# Patient Record
Sex: FEMALE | Race: WHITE | URBAN - METROPOLITAN AREA
[De-identification: names, ages, dates, MRNs, and addresses within clinical notes are randomized per-mention and may not be internally consistent; named-entity substitution may affect disease eponyms.]

---

## 2022-02-15 PROBLEM — H33.333 RETINAL TEAR: Status: WELL-CONTROLLED | Noted: 2022-02-15

## 2022-02-15 PROBLEM — H43.813 POSTERIOR VITREOUS DETACHMENT: Status: STABILIZING | Noted: 2022-02-15

## 2022-02-15 PROBLEM — H43.813 POSTERIOR VITREOUS DETACHMENT: Noted: 2022-02-15

## 2022-02-15 PROBLEM — H26.8 ACQUIRED CATARACT: Noted: 2022-02-15

## 2022-02-15 PROBLEM — H26.8 ACQUIRED CATARACT: Status: STABILIZING | Noted: 2022-02-15

## 2022-06-17 ASSESSMENT — VISUAL ACUITY
OS_CC: 20/20-1
OD_CC: 20/20-1

## 2022-06-21 ENCOUNTER — PROBLEM (OUTPATIENT)
Dept: URBAN - METROPOLITAN AREA CLINIC 66 | Facility: CLINIC | Age: 72
End: 2022-06-21

## 2022-06-21 DIAGNOSIS — H43.813: ICD-10-CM

## 2022-06-21 DIAGNOSIS — H33.333: ICD-10-CM

## 2022-06-21 PROCEDURE — 92014 COMPRE OPH EXAM EST PT 1/>: CPT

## 2022-06-21 PROCEDURE — 92201 OPSCPY EXTND RTA DRAW UNI/BI: CPT

## 2022-06-21 PROCEDURE — 92134 CPTRZ OPH DX IMG PST SGM RTA: CPT

## 2022-06-21 ASSESSMENT — TONOMETRY
OD_IOP_MMHG: 15
OS_IOP_MMHG: 12

## 2022-09-20 ENCOUNTER — FOLLOW UP (OUTPATIENT)
Dept: URBAN - METROPOLITAN AREA CLINIC 66 | Facility: CLINIC | Age: 72
End: 2022-09-20

## 2022-09-20 DIAGNOSIS — H33.333: ICD-10-CM

## 2022-09-20 DIAGNOSIS — H43.813: ICD-10-CM

## 2022-09-20 PROCEDURE — 92134 CPTRZ OPH DX IMG PST SGM RTA: CPT

## 2022-09-20 PROCEDURE — 92201 OPSCPY EXTND RTA DRAW UNI/BI: CPT

## 2022-09-20 PROCEDURE — 92014 COMPRE OPH EXAM EST PT 1/>: CPT

## 2022-09-20 ASSESSMENT — TONOMETRY
OD_IOP_MMHG: 20
OS_IOP_MMHG: 17

## 2022-09-20 ASSESSMENT — VISUAL ACUITY
OD_CC: 20/20-2
OD_PH: 20/20
OS_CC: 20/30+1
OS_PH: 20/20-2

## 2023-03-31 ENCOUNTER — FOLLOW UP (OUTPATIENT)
Dept: URBAN - METROPOLITAN AREA CLINIC 66 | Facility: CLINIC | Age: 73
End: 2023-03-31

## 2023-03-31 DIAGNOSIS — H33.333: ICD-10-CM

## 2023-03-31 DIAGNOSIS — H43.813: ICD-10-CM

## 2023-03-31 DIAGNOSIS — H35.61: ICD-10-CM

## 2023-03-31 PROCEDURE — 67145 PROPH RTA DTCHMNT PC: CPT

## 2023-03-31 PROCEDURE — 92014 COMPRE OPH EXAM EST PT 1/>: CPT | Mod: 25

## 2023-03-31 PROCEDURE — 92202 OPSCPY EXTND ON/MAC DRAW: CPT

## 2023-03-31 ASSESSMENT — VISUAL ACUITY
OD_CC: 20/20
OS_CC: 20/20-2

## 2023-03-31 ASSESSMENT — TONOMETRY
OS_IOP_MMHG: 15
OD_IOP_MMHG: 15

## 2023-05-19 ENCOUNTER — FOLLOW UP (OUTPATIENT)
Dept: URBAN - METROPOLITAN AREA CLINIC 66 | Facility: CLINIC | Age: 73
End: 2023-05-19

## 2023-05-19 DIAGNOSIS — H43.813: ICD-10-CM

## 2023-05-19 DIAGNOSIS — H33.333: ICD-10-CM

## 2023-05-19 PROCEDURE — 92014 COMPRE OPH EXAM EST PT 1/>: CPT

## 2023-05-19 PROCEDURE — 92201 OPSCPY EXTND RTA DRAW UNI/BI: CPT

## 2023-05-19 PROCEDURE — 92134 CPTRZ OPH DX IMG PST SGM RTA: CPT

## 2023-05-19 ASSESSMENT — VISUAL ACUITY
OS_CC: 20/25-2
OD_CC: 20/25-1

## 2023-05-19 ASSESSMENT — TONOMETRY
OD_IOP_MMHG: 17
OS_IOP_MMHG: 18

## 2023-05-25 ENCOUNTER — APPOINTMENT (OUTPATIENT)
Dept: URBAN - METROPOLITAN AREA CLINIC 278 | Age: 73
Setting detail: DERMATOLOGY
End: 2023-05-25

## 2023-05-25 DIAGNOSIS — Z80.8 FAMILY HISTORY OF MALIGNANT NEOPLASM OF OTHER ORGANS OR SYSTEMS: ICD-10-CM

## 2023-05-25 DIAGNOSIS — D18.0 HEMANGIOMA: ICD-10-CM

## 2023-05-25 DIAGNOSIS — L72.8 OTHER FOLLICULAR CYSTS OF THE SKIN AND SUBCUTANEOUS TISSUE: ICD-10-CM

## 2023-05-25 DIAGNOSIS — L82.1 OTHER SEBORRHEIC KERATOSIS: ICD-10-CM

## 2023-05-25 DIAGNOSIS — Z71.89 OTHER SPECIFIED COUNSELING: ICD-10-CM

## 2023-05-25 DIAGNOSIS — B35.1 TINEA UNGUIUM: ICD-10-CM

## 2023-05-25 DIAGNOSIS — L72.11 PILAR CYST: ICD-10-CM

## 2023-05-25 DIAGNOSIS — D22 MELANOCYTIC NEVI: ICD-10-CM

## 2023-05-25 DIAGNOSIS — L81.4 OTHER MELANIN HYPERPIGMENTATION: ICD-10-CM

## 2023-05-25 PROBLEM — D22.5 MELANOCYTIC NEVI OF TRUNK: Status: ACTIVE | Noted: 2023-05-25

## 2023-05-25 PROBLEM — D18.01 HEMANGIOMA OF SKIN AND SUBCUTANEOUS TISSUE: Status: ACTIVE | Noted: 2023-05-25

## 2023-05-25 PROCEDURE — OTHER SUNSCREEN RECOMMENDATIONS: OTHER

## 2023-05-25 PROCEDURE — OTHER DEFER: OTHER

## 2023-05-25 PROCEDURE — OTHER OBSERVATION: OTHER

## 2023-05-25 PROCEDURE — OTHER REASSURANCE: OTHER

## 2023-05-25 PROCEDURE — OTHER MIPS QUALITY: OTHER

## 2023-05-25 PROCEDURE — OTHER COUNSELING: OTHER

## 2023-05-25 PROCEDURE — OTHER PRESCRIPTION: OTHER

## 2023-05-25 PROCEDURE — OTHER TREATMENT REGIMEN: OTHER

## 2023-05-25 PROCEDURE — 99204 OFFICE O/P NEW MOD 45 MIN: CPT

## 2023-05-25 RX ORDER — CICLOPIROX 80 MG/ML
SOLUTION TOPICAL
Qty: 6.6 | Refills: 4 | Status: ERX | COMMUNITY
Start: 2023-05-25

## 2023-05-25 ASSESSMENT — LOCATION SIMPLE DESCRIPTION DERM
LOCATION SIMPLE: LEFT OCCIPITAL SCALP
LOCATION SIMPLE: RIGHT GREAT TOE
LOCATION SIMPLE: LEFT UPPER BACK
LOCATION SIMPLE: RIGHT UPPER BACK
LOCATION SIMPLE: CHEST
LOCATION SIMPLE: RIGHT GREAT TOE
LOCATION SIMPLE: LEFT UPPER BACK
LOCATION SIMPLE: RIGHT UPPER BACK
LOCATION SIMPLE: UPPER BACK
LOCATION SIMPLE: CHEST
LOCATION SIMPLE: LEFT OCCIPITAL SCALP

## 2023-05-25 ASSESSMENT — LOCATION DETAILED DESCRIPTION DERM
LOCATION DETAILED: LEFT SUPERIOR OCCIPITAL SCALP
LOCATION DETAILED: LOWER STERNUM
LOCATION DETAILED: RIGHT MEDIAL UPPER BACK
LOCATION DETAILED: LEFT INFERIOR MEDIAL UPPER BACK
LOCATION DETAILED: RIGHT GREAT TOENAIL
LOCATION DETAILED: LEFT INFERIOR MEDIAL UPPER BACK
LOCATION DETAILED: LEFT SUPERIOR OCCIPITAL SCALP
LOCATION DETAILED: LEFT MID-UPPER BACK
LOCATION DETAILED: RIGHT GREAT TOENAIL
LOCATION DETAILED: RIGHT MEDIAL UPPER BACK
LOCATION DETAILED: LEFT MID-UPPER BACK
LOCATION DETAILED: SUPERIOR THORACIC SPINE
LOCATION DETAILED: RIGHT MID-UPPER BACK
LOCATION DETAILED: RIGHT INFERIOR MEDIAL UPPER BACK
LOCATION DETAILED: LOWER STERNUM

## 2023-05-25 ASSESSMENT — LOCATION ZONE DERM
LOCATION ZONE: SCALP
LOCATION ZONE: TRUNK
LOCATION ZONE: TOENAIL
LOCATION ZONE: TRUNK
LOCATION ZONE: TOENAIL
LOCATION ZONE: SCALP

## 2023-05-25 NOTE — PROCEDURE: TREATMENT REGIMEN
Initiate Treatment: START topical ciclopirox 8% solution to affected nail once daily
Detail Level: Zone

## 2023-06-14 ENCOUNTER — PROBLEM (OUTPATIENT)
Dept: URBAN - METROPOLITAN AREA CLINIC 58 | Facility: CLINIC | Age: 73
End: 2023-06-14

## 2023-06-14 DIAGNOSIS — H26.8: ICD-10-CM

## 2023-06-14 DIAGNOSIS — H40.013: ICD-10-CM

## 2023-06-14 DIAGNOSIS — H11.31: ICD-10-CM

## 2023-06-14 DIAGNOSIS — H43.813: ICD-10-CM

## 2023-06-14 DIAGNOSIS — H33.333: ICD-10-CM

## 2023-06-14 PROCEDURE — 92134 CPTRZ OPH DX IMG PST SGM RTA: CPT

## 2023-06-14 PROCEDURE — 92014 COMPRE OPH EXAM EST PT 1/>: CPT

## 2023-06-14 PROCEDURE — 92201 OPSCPY EXTND RTA DRAW UNI/BI: CPT

## 2023-06-14 ASSESSMENT — VISUAL ACUITY
OD_CC: 20/25-2
OS_PH: 20/30-1
OS_CC: 20/40

## 2023-06-14 ASSESSMENT — TONOMETRY
OD_IOP_MMHG: 15
OS_IOP_MMHG: 17

## 2023-06-22 ENCOUNTER — PROBLEM (OUTPATIENT)
Dept: URBAN - METROPOLITAN AREA CLINIC 80 | Facility: CLINIC | Age: 73
End: 2023-06-22

## 2023-06-22 DIAGNOSIS — H25.043: ICD-10-CM

## 2023-06-22 DIAGNOSIS — H33.333: ICD-10-CM

## 2023-06-22 DIAGNOSIS — H40.013: ICD-10-CM

## 2023-06-22 DIAGNOSIS — H43.813: ICD-10-CM

## 2023-06-22 DIAGNOSIS — H26.8: ICD-10-CM

## 2023-06-22 PROCEDURE — 92201 OPSCPY EXTND RTA DRAW UNI/BI: CPT

## 2023-06-22 PROCEDURE — 92014 COMPRE OPH EXAM EST PT 1/>: CPT

## 2023-06-22 PROCEDURE — 92134 CPTRZ OPH DX IMG PST SGM RTA: CPT

## 2023-06-22 ASSESSMENT — TONOMETRY
OS_IOP_MMHG: 16
OD_IOP_MMHG: 17

## 2023-06-22 ASSESSMENT — VISUAL ACUITY
OD_CC: 20/25+2
OS_CC: 20/30+2

## 2023-11-14 ENCOUNTER — FOLLOW UP (OUTPATIENT)
Dept: URBAN - METROPOLITAN AREA CLINIC 80 | Facility: CLINIC | Age: 73
End: 2023-11-14

## 2023-11-14 DIAGNOSIS — H25.043: ICD-10-CM

## 2023-11-14 DIAGNOSIS — H26.8: ICD-10-CM

## 2023-11-14 DIAGNOSIS — H33.333: ICD-10-CM

## 2023-11-14 DIAGNOSIS — H43.813: ICD-10-CM

## 2023-11-14 DIAGNOSIS — H40.013: ICD-10-CM

## 2023-11-14 PROCEDURE — 92014 COMPRE OPH EXAM EST PT 1/>: CPT

## 2023-11-14 PROCEDURE — 92134 CPTRZ OPH DX IMG PST SGM RTA: CPT

## 2023-11-14 PROCEDURE — 92201 OPSCPY EXTND RTA DRAW UNI/BI: CPT

## 2023-11-14 ASSESSMENT — VISUAL ACUITY
OD_CC: 20/25+1
OS_CC: 20/30-1

## 2023-11-14 ASSESSMENT — TONOMETRY
OD_IOP_MMHG: 17
OS_IOP_MMHG: 17

## 2024-04-11 ENCOUNTER — FOLLOW UP (OUTPATIENT)
Dept: URBAN - METROPOLITAN AREA CLINIC 58 | Facility: CLINIC | Age: 74
End: 2024-04-11

## 2024-04-11 DIAGNOSIS — H25.043: ICD-10-CM

## 2024-04-11 DIAGNOSIS — H43.813: ICD-10-CM

## 2024-04-11 DIAGNOSIS — H04.123: ICD-10-CM

## 2024-04-11 DIAGNOSIS — H26.8: ICD-10-CM

## 2024-04-11 DIAGNOSIS — H40.013: ICD-10-CM

## 2024-04-11 DIAGNOSIS — H33.333: ICD-10-CM

## 2024-04-11 PROCEDURE — 92201 OPSCPY EXTND RTA DRAW UNI/BI: CPT

## 2024-04-11 PROCEDURE — 92134 CPTRZ OPH DX IMG PST SGM RTA: CPT

## 2024-04-11 PROCEDURE — 92014 COMPRE OPH EXAM EST PT 1/>: CPT

## 2024-04-11 ASSESSMENT — TONOMETRY
OD_IOP_MMHG: 17
OS_IOP_MMHG: 15

## 2024-04-11 ASSESSMENT — VISUAL ACUITY
OS_CC: 20/30-1
OD_CC: 20/20-2

## 2024-07-15 ENCOUNTER — FOLLOW UP (OUTPATIENT)
Dept: URBAN - METROPOLITAN AREA CLINIC 58 | Facility: CLINIC | Age: 74
End: 2024-07-15

## 2024-07-15 DIAGNOSIS — H25.043: ICD-10-CM

## 2024-07-15 DIAGNOSIS — H43.813: ICD-10-CM

## 2024-07-15 DIAGNOSIS — H04.123: ICD-10-CM

## 2024-07-15 DIAGNOSIS — H33.333: ICD-10-CM

## 2024-07-15 DIAGNOSIS — H26.8: ICD-10-CM

## 2024-07-15 DIAGNOSIS — H40.013: ICD-10-CM

## 2024-07-15 PROCEDURE — 92134 CPTRZ OPH DX IMG PST SGM RTA: CPT

## 2024-07-15 PROCEDURE — 92014 COMPRE OPH EXAM EST PT 1/>: CPT

## 2024-07-15 PROCEDURE — 92201 OPSCPY EXTND RTA DRAW UNI/BI: CPT

## 2024-07-15 ASSESSMENT — VISUAL ACUITY
OS_PH: 20/25-1
OD_CC: 20/20-2
OS_CC: 20/30-2

## 2024-07-15 ASSESSMENT — TONOMETRY
OD_IOP_MMHG: 23
OS_IOP_MMHG: 23

## 2024-11-01 ENCOUNTER — FOLLOW UP (OUTPATIENT)
Dept: URBAN - METROPOLITAN AREA CLINIC 66 | Facility: CLINIC | Age: 74
End: 2024-11-01

## 2024-11-01 DIAGNOSIS — H04.123: ICD-10-CM

## 2024-11-01 DIAGNOSIS — H40.013: ICD-10-CM

## 2024-11-01 DIAGNOSIS — H43.813: ICD-10-CM

## 2024-11-01 DIAGNOSIS — H25.043: ICD-10-CM

## 2024-11-01 DIAGNOSIS — H33.333: ICD-10-CM

## 2024-11-01 PROCEDURE — 92134 CPTRZ OPH DX IMG PST SGM RTA: CPT

## 2024-11-01 PROCEDURE — 92201 OPSCPY EXTND RTA DRAW UNI/BI: CPT

## 2024-11-01 PROCEDURE — 92014 COMPRE OPH EXAM EST PT 1/>: CPT

## 2024-11-01 ASSESSMENT — VISUAL ACUITY
OS_CC: 20/30-2
OD_CC: 20/25

## 2024-11-01 ASSESSMENT — TONOMETRY
OD_IOP_MMHG: 18
OS_IOP_MMHG: 18

## 2025-02-26 ENCOUNTER — FOLLOW UP (OUTPATIENT)
Dept: URBAN - METROPOLITAN AREA CLINIC 66 | Facility: CLINIC | Age: 75
End: 2025-02-26

## 2025-02-26 DIAGNOSIS — H40.013: ICD-10-CM

## 2025-02-26 DIAGNOSIS — H43.813: ICD-10-CM

## 2025-02-26 DIAGNOSIS — H33.333: ICD-10-CM

## 2025-02-26 PROCEDURE — 92201 OPSCPY EXTND RTA DRAW UNI/BI: CPT

## 2025-02-26 PROCEDURE — 92134 CPTRZ OPH DX IMG PST SGM RTA: CPT

## 2025-02-26 PROCEDURE — 92014 COMPRE OPH EXAM EST PT 1/>: CPT

## 2025-02-26 ASSESSMENT — TONOMETRY
OS_IOP_MMHG: 16
OD_IOP_MMHG: 16

## 2025-02-26 ASSESSMENT — VISUAL ACUITY
OS_PH: 20/20
OS_CC: 20/50-2
OD_CC: 20/25-2

## 2025-07-30 ENCOUNTER — FOLLOW UP (OUTPATIENT)
Age: 75
End: 2025-07-30

## 2025-07-30 DIAGNOSIS — H33.333: ICD-10-CM

## 2025-07-30 DIAGNOSIS — H43.813: ICD-10-CM

## 2025-07-30 PROCEDURE — 92134 CPTRZ OPH DX IMG PST SGM RTA: CPT

## 2025-07-30 PROCEDURE — 92201 OPSCPY EXTND RTA DRAW UNI/BI: CPT

## 2025-07-30 PROCEDURE — 92014 COMPRE OPH EXAM EST PT 1/>: CPT

## 2025-07-30 ASSESSMENT — VISUAL ACUITY
OS_CC: 20/50-2
OD_CC: 20/25+1

## 2025-07-30 ASSESSMENT — TONOMETRY
OD_IOP_MMHG: 20
OS_IOP_MMHG: 19